# Patient Record
Sex: FEMALE | Race: BLACK OR AFRICAN AMERICAN | NOT HISPANIC OR LATINO | URBAN - METROPOLITAN AREA
[De-identification: names, ages, dates, MRNs, and addresses within clinical notes are randomized per-mention and may not be internally consistent; named-entity substitution may affect disease eponyms.]

---

## 2021-02-23 ENCOUNTER — LAB OUTSIDE AN ENCOUNTER (OUTPATIENT)
Dept: URBAN - METROPOLITAN AREA CLINIC 96 | Facility: CLINIC | Age: 46
End: 2021-02-23

## 2021-02-23 ENCOUNTER — OFFICE VISIT (OUTPATIENT)
Dept: URBAN - METROPOLITAN AREA CLINIC 96 | Facility: CLINIC | Age: 46
End: 2021-02-23
Payer: COMMERCIAL

## 2021-02-23 DIAGNOSIS — K64.9 HEMORRHOID: ICD-10-CM

## 2021-02-23 DIAGNOSIS — K59.00 CONSTIPATION, UNSPECIFIED: ICD-10-CM

## 2021-02-23 DIAGNOSIS — K64.8 BLEEDING INTERNAL HEMORRHOIDS: ICD-10-CM

## 2021-02-23 DIAGNOSIS — Z12.11 SCREENING FOR COLON CANCER: ICD-10-CM

## 2021-02-23 DIAGNOSIS — K58.1 IRRITABLE BOWEL SYNDROME WITH CONSTIPATION: ICD-10-CM

## 2021-02-23 DIAGNOSIS — K58.9 IBS (IRRITABLE BOWEL SYNDROME): ICD-10-CM

## 2021-02-23 PROCEDURE — G9903 PT SCRN TBCO ID AS NON USER: HCPCS | Performed by: INTERNAL MEDICINE

## 2021-02-23 PROCEDURE — G9622 NO UNHEAL ETOH USER: HCPCS | Performed by: INTERNAL MEDICINE

## 2021-02-23 PROCEDURE — G8482 FLU IMMUNIZE ORDER/ADMIN: HCPCS | Performed by: INTERNAL MEDICINE

## 2021-02-23 PROCEDURE — 3017F COLORECTAL CA SCREEN DOC REV: CPT | Performed by: INTERNAL MEDICINE

## 2021-02-23 PROCEDURE — G8420 CALC BMI NORM PARAMETERS: HCPCS | Performed by: INTERNAL MEDICINE

## 2021-02-23 PROCEDURE — 1036F TOBACCO NON-USER: CPT | Performed by: INTERNAL MEDICINE

## 2021-02-23 PROCEDURE — G8427 DOCREV CUR MEDS BY ELIG CLIN: HCPCS | Performed by: INTERNAL MEDICINE

## 2021-02-23 PROCEDURE — 99205 OFFICE O/P NEW HI 60 MIN: CPT | Performed by: INTERNAL MEDICINE

## 2021-02-23 RX ORDER — SODIUM, POTASSIUM,MAG SULFATES 17.5-3.13G
TAKE 177 ML SOLUTION, RECONSTITUTED, ORAL ORAL
Qty: 177 ML | Refills: 0 | OUTPATIENT
Start: 2021-02-23 | End: 2021-02-24

## 2021-02-23 RX ORDER — OMEPRAZOLE 40 MG/1
TAKE 1 CAPSULE (40 MG) BY ORAL ROUTE ONCE DAILY BEFORE A MEAL FOR 30 DAYS CAPSULE, DELAYED RELEASE PELLETS ORAL 1
Qty: 30 | Refills: 2 | Status: DISCONTINUED | COMMUNITY
Start: 2017-08-14

## 2021-02-23 NOTE — HPI-TODAY'S VISIT:
Pt is here for severe rectal pain and has a chronic constipation. . Today on 2/23/2021, pt reports that even as a kid, she has had stubborn bowels and constipation.  She has low water intake and has to take laxatives (tea).  She took extra strength tea the other day, she had severe pain and felt her hemorrhoids came out.  Normally they come out but she is able to put them back in.  She applied some external cream/lidocaine with prep-H.  She cannot sit due to the pain.  Has been standing to relieve the pain.  She did have a BM today (increased her fruit/water intake).  Had some bleeding today. . SH: works in admissions at [employer]: No IBD/colon cancer PMH: None . 12/2015: MR Defecogram: normal function. .

## 2021-04-26 ENCOUNTER — OFFICE VISIT (OUTPATIENT)
Dept: URBAN - METROPOLITAN AREA TELEHEALTH 2 | Facility: TELEHEALTH | Age: 46
End: 2021-04-26
Payer: COMMERCIAL

## 2021-04-26 ENCOUNTER — TELEPHONE ENCOUNTER (OUTPATIENT)
Dept: URBAN - METROPOLITAN AREA CLINIC 92 | Facility: CLINIC | Age: 46
End: 2021-04-26

## 2021-04-26 DIAGNOSIS — K59.00 CONSTIPATION, UNSPECIFIED: ICD-10-CM

## 2021-04-26 DIAGNOSIS — K64.9 HEMORRHOID: ICD-10-CM

## 2021-04-26 DIAGNOSIS — Z12.11 SCREENING FOR COLON CANCER: ICD-10-CM

## 2021-04-26 PROCEDURE — 99213 OFFICE O/P EST LOW 20 MIN: CPT | Performed by: INTERNAL MEDICINE

## 2021-04-26 RX ORDER — SODIUM PICOSULFATE, MAGNESIUM OXIDE, AND ANHYDROUS CITRIC ACID 10; 3.5; 12 MG/160ML; G/160ML; G/160ML
160 ML LIQUID ORAL
Qty: 320 MILLILITER | Refills: 0 | OUTPATIENT
Start: 2021-04-26 | End: 2021-04-27

## 2021-04-26 NOTE — HPI-TODAY'S VISIT:
This is a 45 yo female with a history of constipation and hemorrhoids.  She was last seen by Dr Díaz FEb 2021 and was diagnosed with an external thrombosed hemorrhoid.  She was referred to [facility] colorectal surgery and underwent surgical excision.  She is here today for follow up.   Anal pain has resolved.  She continues to suffer from constipation.  Stool evacuaton occurs every 4 days.  Miralax improves stool evacuation to daily.  An MRI defecography 2015 demonstrated a small anterior rectocele but there was near complete emptying.  A sitz marker also done that year revealed stool through out the colon and markers in the left colon - nubmer of markers was not quantified.  She has never had a colonoscopy.  She prefers are more natural approach for treatment.  Prune juice was successful in childhood.   She has not taken this as an adult.

## 2021-04-28 PROBLEM — 197119006 ACUTE CONSTIPATION: Status: ACTIVE | Noted: 2021-04-28

## 2021-04-28 PROBLEM — 10743008 COLON SPASM: Status: ACTIVE | Noted: 2021-04-28

## 2021-06-29 ENCOUNTER — TELEPHONE ENCOUNTER (OUTPATIENT)
Dept: URBAN - METROPOLITAN AREA CLINIC 92 | Facility: CLINIC | Age: 46
End: 2021-06-29

## 2021-06-29 ENCOUNTER — OFFICE VISIT (OUTPATIENT)
Dept: URBAN - METROPOLITAN AREA SURGERY CENTER 16 | Facility: SURGERY CENTER | Age: 46
End: 2021-06-29
Payer: COMMERCIAL

## 2021-06-29 DIAGNOSIS — Z12.11 COLON CANCER SCREENING: ICD-10-CM

## 2021-06-29 PROCEDURE — G8907 PT DOC NO EVENTS ON DISCHARG: HCPCS | Performed by: INTERNAL MEDICINE

## 2021-06-29 PROCEDURE — G0121 COLON CA SCRN NOT HI RSK IND: HCPCS | Performed by: INTERNAL MEDICINE

## 2021-06-29 RX ORDER — LINACLOTIDE 145 UG/1
1 CAPSULE AT LEAST 30 MINUTES BEFORE THE FIRST MEAL CAPSULE, GELATIN COATED ORAL ONCE A DAY
Qty: 30 | Refills: 2 | OUTPATIENT
Start: 2021-06-29 | End: 2021-09-27

## 2021-08-02 ENCOUNTER — DASHBOARD ENCOUNTERS (OUTPATIENT)
Age: 46
End: 2021-08-02

## 2021-08-09 ENCOUNTER — OFFICE VISIT (OUTPATIENT)
Dept: URBAN - METROPOLITAN AREA TELEHEALTH 2 | Facility: TELEHEALTH | Age: 46
End: 2021-08-09
Payer: COMMERCIAL

## 2021-08-09 DIAGNOSIS — K59.00 CONSTIPATION, UNSPECIFIED: ICD-10-CM

## 2021-08-09 PROBLEM — 14760008 CONSTIPATION: Status: ACTIVE | Noted: 2021-02-23

## 2021-08-09 PROCEDURE — 99213 OFFICE O/P EST LOW 20 MIN: CPT | Performed by: INTERNAL MEDICINE

## 2021-08-09 RX ORDER — LINACLOTIDE 145 UG/1
1 CAPSULE AT LEAST 30 MINUTES BEFORE THE FIRST MEAL CAPSULE, GELATIN COATED ORAL ONCE A DAY
Qty: 30 | Refills: 2 | Status: ACTIVE | COMMUNITY
Start: 2021-06-29 | End: 2021-09-27

## 2021-08-09 NOTE — HPI-TODAY'S VISIT:
(April 2021) This is a 47 yo female with a history of constipation and hemorrhoids.  She was last seen by Dr Díaz FEb 2021 and was diagnosed with an external thrombosed hemorrhoid.  She was referred to [facility] colorectal surgery and underwent surgical excision.  She is here today for follow up.   Anal pain has resolved.  She continues to suffer from constipation.  Stool evacuaton occurs every 4 days.  Miralax improves stool evacuation to daily.  An MRI defecography 2015 demonstrated a small anterior rectocele but there was near complete emptying.  A sitz marker also done that year revealed stool through out the colon and markers in the left colon - nubmer of markers was not quantified.  She has never had a colonoscopy.  She prefers are more natural approach for treatment.  Prune juice was successful in childhood.   She has not taken this as an adult.  Today: August 9, 2021 Johanna is here for f/u of constipation and a colonoscopy.  The colonoscopy July 2021 demonstrated diverticulosis but was otherwise unremarkable.  Constipaton has not improved.  She opted not to take Linzess as prescribed.  She prefers natural products.  However she has not tried prune, increased excercise or water intake as previously recommended.

## 2024-12-19 ENCOUNTER — OFFICE VISIT (OUTPATIENT)
Dept: URBAN - METROPOLITAN AREA CLINIC 92 | Facility: CLINIC | Age: 49
End: 2024-12-19
Payer: COMMERCIAL

## 2024-12-19 ENCOUNTER — LAB OUTSIDE AN ENCOUNTER (OUTPATIENT)
Dept: URBAN - METROPOLITAN AREA CLINIC 92 | Facility: CLINIC | Age: 49
End: 2024-12-19

## 2024-12-19 VITALS
DIASTOLIC BLOOD PRESSURE: 78 MMHG | WEIGHT: 144 LBS | BODY MASS INDEX: 25.52 KG/M2 | TEMPERATURE: 98 F | HEIGHT: 63 IN | HEART RATE: 58 BPM | SYSTOLIC BLOOD PRESSURE: 124 MMHG

## 2024-12-19 DIAGNOSIS — K59.00 CONSTIPATION, UNSPECIFIED: ICD-10-CM

## 2024-12-19 DIAGNOSIS — R14.2 BELCHING: ICD-10-CM

## 2024-12-19 DIAGNOSIS — R14.0 BLOATING: ICD-10-CM

## 2024-12-19 PROCEDURE — 99204 OFFICE O/P NEW MOD 45 MIN: CPT | Performed by: INTERNAL MEDICINE

## 2024-12-19 RX ORDER — LINACLOTIDE 145 UG/1
1 CAPSULE AT LEAST 30 MINUTES BEFORE THE FIRST MEAL OF THE DAY ON AN EMPTY STOMACH CAPSULE, GELATIN COATED ORAL ONCE A DAY
Qty: 30 | Refills: 11 | OUTPATIENT
Start: 2024-12-19 | End: 2025-12-14

## 2024-12-19 NOTE — PHYSICAL EXAM RECTAL:
normal tone, no external hemorrhoids, no masses palpable, no red blood, Tenderness on LIAN, Internal hemorrhoids present

## 2024-12-19 NOTE — PHYSICAL EXAM CARDIOVASCULAR:
no edema, no murmurs, regular rate and rhythm Surgeon/Pathologist Verbiage (Will Incorporate Name Of Surgeon From Intro If Not Blank): operated in two distinct and integrated capacities as the surgeon and pathologist.

## 2024-12-19 NOTE — HPI-TODAY'S VISIT:
This is a 48 yo F here for constipation.  This has been a chronic issue most of her life. Colonoscopy 2021 was unremarkable.   She is also complaining of significant gas which worsened since she started working as a  again  2023.   Flying has worsened the issue.   Even if she does not eat she has significant gas.   She does not beef or chicken but does eat a lot of pasta and rice.

## 2024-12-20 LAB
IMMUNOGLOBULIN A: 115
INTERPRETATION: (no result)
TISSUE TRANSGLUTAMINASE AB, IGA: <1

## 2025-03-13 ENCOUNTER — OFFICE VISIT (OUTPATIENT)
Dept: URBAN - METROPOLITAN AREA CLINIC 92 | Facility: CLINIC | Age: 50
End: 2025-03-13